# Patient Record
Sex: MALE | Race: BLACK OR AFRICAN AMERICAN | NOT HISPANIC OR LATINO | Employment: FULL TIME | URBAN - METROPOLITAN AREA
[De-identification: names, ages, dates, MRNs, and addresses within clinical notes are randomized per-mention and may not be internally consistent; named-entity substitution may affect disease eponyms.]

---

## 2023-09-27 PROBLEM — N18.6 END-STAGE RENAL DISEASE (MULTI): Status: ACTIVE | Noted: 2023-09-27

## 2023-09-27 PROBLEM — I10 ESSENTIAL HYPERTENSION: Status: ACTIVE | Noted: 2023-09-27

## 2023-09-27 RX ORDER — FUROSEMIDE 80 MG/1
TABLET ORAL
COMMUNITY

## 2023-09-27 RX ORDER — LABETALOL 200 MG/1
1 TABLET, FILM COATED ORAL 3 TIMES DAILY
COMMUNITY

## 2023-09-27 RX ORDER — AMLODIPINE BESYLATE 10 MG/1
TABLET ORAL
COMMUNITY

## 2023-10-04 ENCOUNTER — TELEPHONE (OUTPATIENT)
Dept: TRANSPLANT | Facility: HOSPITAL | Age: 48
End: 2023-10-04
Payer: MEDICARE

## 2023-10-05 ENCOUNTER — APPOINTMENT (OUTPATIENT)
Dept: RADIOLOGY | Facility: HOSPITAL | Age: 48
End: 2023-10-05
Payer: COMMERCIAL

## 2023-10-05 ENCOUNTER — APPOINTMENT (OUTPATIENT)
Dept: TRANSPLANT | Facility: HOSPITAL | Age: 48
End: 2023-10-05
Payer: COMMERCIAL

## 2023-10-05 ENCOUNTER — APPOINTMENT (OUTPATIENT)
Dept: RADIOLOGY | Facility: HOSPITAL | Age: 48
End: 2023-10-05
Payer: MEDICARE

## 2023-10-05 ENCOUNTER — APPOINTMENT (OUTPATIENT)
Dept: CARDIOLOGY | Facility: HOSPITAL | Age: 48
End: 2023-10-05
Payer: MEDICARE

## 2023-10-05 ENCOUNTER — APPOINTMENT (OUTPATIENT)
Dept: TRANSPLANT | Facility: HOSPITAL | Age: 48
End: 2023-10-05
Payer: MEDICARE

## 2023-10-31 DIAGNOSIS — Z01.818 PREOP EXAMINATION: Primary | ICD-10-CM

## 2023-11-01 ENCOUNTER — APPOINTMENT (OUTPATIENT)
Dept: TRANSPLANT | Facility: HOSPITAL | Age: 48
End: 2023-11-01
Payer: MEDICARE

## 2023-11-02 ENCOUNTER — HOSPITAL ENCOUNTER (OUTPATIENT)
Dept: RADIOLOGY | Facility: HOSPITAL | Age: 48
Discharge: HOME | End: 2023-11-02
Payer: MEDICARE

## 2023-11-02 ENCOUNTER — OFFICE VISIT (OUTPATIENT)
Dept: TRANSPLANT | Facility: HOSPITAL | Age: 48
End: 2023-11-02
Payer: MEDICARE

## 2023-11-02 ENCOUNTER — APPOINTMENT (OUTPATIENT)
Dept: TRANSPLANT | Facility: HOSPITAL | Age: 48
End: 2023-11-02
Payer: MEDICARE

## 2023-11-02 ENCOUNTER — DOCUMENTATION (OUTPATIENT)
Dept: TRANSPLANT | Facility: HOSPITAL | Age: 48
End: 2023-11-02

## 2023-11-02 ENCOUNTER — LAB (OUTPATIENT)
Dept: LAB | Facility: LAB | Age: 48
End: 2023-11-02
Payer: MEDICARE

## 2023-11-02 ENCOUNTER — APPOINTMENT (OUTPATIENT)
Dept: RADIOLOGY | Facility: HOSPITAL | Age: 48
End: 2023-11-02
Payer: MEDICARE

## 2023-11-02 ENCOUNTER — HOSPITAL ENCOUNTER (OUTPATIENT)
Dept: CARDIOLOGY | Facility: HOSPITAL | Age: 48
Discharge: HOME | End: 2023-11-02
Payer: MEDICARE

## 2023-11-02 VITALS
OXYGEN SATURATION: 99 % | DIASTOLIC BLOOD PRESSURE: 100 MMHG | HEART RATE: 90 BPM | HEIGHT: 66 IN | BODY MASS INDEX: 36.82 KG/M2 | SYSTOLIC BLOOD PRESSURE: 134 MMHG | WEIGHT: 229.1 LBS | TEMPERATURE: 97.5 F

## 2023-11-02 DIAGNOSIS — Z01.818 ENCOUNTER FOR OTHER PREPROCEDURAL EXAMINATION: ICD-10-CM

## 2023-11-02 DIAGNOSIS — Z01.818 PRE-TRANSPLANT EVALUATION FOR KIDNEY TRANSPLANT: Primary | ICD-10-CM

## 2023-11-02 DIAGNOSIS — I10 ESSENTIAL HYPERTENSION: ICD-10-CM

## 2023-11-02 DIAGNOSIS — Z01.818 ENCOUNTER FOR OTHER PREPROCEDURAL EXAMINATION: Primary | ICD-10-CM

## 2023-11-02 DIAGNOSIS — N18.6 ANEMIA IN ESRD (END-STAGE RENAL DISEASE) (MULTI): ICD-10-CM

## 2023-11-02 DIAGNOSIS — D63.1 ANEMIA IN ESRD (END-STAGE RENAL DISEASE) (MULTI): ICD-10-CM

## 2023-11-02 DIAGNOSIS — N18.6 ESRD (END STAGE RENAL DISEASE) (MULTI): ICD-10-CM

## 2023-11-02 LAB
CMV IGG AVIDITY SERPL IA-RTO: REACTIVE %
EST. AVERAGE GLUCOSE BLD GHB EST-MCNC: 114 MG/DL
HBA1C MFR BLD: 5.6 %
HBV CORE AB SER QL: NONREACTIVE
HBV SURFACE AB SER-ACNC: 6.8 MIU/ML
HBV SURFACE AG SERPL QL IA: NONREACTIVE
HCV AB SER QL: NONREACTIVE
HIV 1+2 AB+HIV1 P24 AG SERPL QL IA: NONREACTIVE
PSA SERPL-MCNC: 0.48 NG/ML
T PALLIDUM AB SER QL: NONREACTIVE

## 2023-11-02 PROCEDURE — 86803 HEPATITIS C AB TEST: CPT

## 2023-11-02 PROCEDURE — 87389 HIV-1 AG W/HIV-1&-2 AB AG IA: CPT

## 2023-11-02 PROCEDURE — 36415 COLL VENOUS BLD VENIPUNCTURE: CPT

## 2023-11-02 PROCEDURE — 93017 CV STRESS TEST TRACING ONLY: CPT

## 2023-11-02 PROCEDURE — 3075F SYST BP GE 130 - 139MM HG: CPT | Performed by: INTERNAL MEDICINE

## 2023-11-02 PROCEDURE — 93018 CV STRESS TEST I&R ONLY: CPT | Performed by: RADIOLOGY

## 2023-11-02 PROCEDURE — 78452 HT MUSCLE IMAGE SPECT MULT: CPT | Performed by: RADIOLOGY

## 2023-11-02 PROCEDURE — 86706 HEP B SURFACE ANTIBODY: CPT

## 2023-11-02 PROCEDURE — 80307 DRUG TEST PRSMV CHEM ANLYZR: CPT

## 2023-11-02 PROCEDURE — 80323 ALKALOIDS NOS: CPT

## 2023-11-02 PROCEDURE — A9502 TC99M TETROFOSMIN: HCPCS | Performed by: TRANSPLANT SURGERY

## 2023-11-02 PROCEDURE — 78452 HT MUSCLE IMAGE SPECT MULT: CPT

## 2023-11-02 PROCEDURE — 80349 CANNABINOIDS NATURAL: CPT

## 2023-11-02 PROCEDURE — 3430000001 HC RX 343 DIAGNOSTIC RADIOPHARMACEUTICALS: Performed by: TRANSPLANT SURGERY

## 2023-11-02 PROCEDURE — 99214 OFFICE O/P EST MOD 30 MIN: CPT | Performed by: INTERNAL MEDICINE

## 2023-11-02 PROCEDURE — 3080F DIAST BP >= 90 MM HG: CPT | Performed by: INTERNAL MEDICINE

## 2023-11-02 PROCEDURE — 86481 TB AG RESPONSE T-CELL SUSP: CPT

## 2023-11-02 PROCEDURE — 87340 HEPATITIS B SURFACE AG IA: CPT

## 2023-11-02 PROCEDURE — 86704 HEP B CORE ANTIBODY TOTAL: CPT

## 2023-11-02 PROCEDURE — 84153 ASSAY OF PSA TOTAL: CPT

## 2023-11-02 PROCEDURE — 83036 HEMOGLOBIN GLYCOSYLATED A1C: CPT

## 2023-11-02 PROCEDURE — 93016 CV STRESS TEST SUPVJ ONLY: CPT | Performed by: RADIOLOGY

## 2023-11-02 PROCEDURE — 86780 TREPONEMA PALLIDUM: CPT

## 2023-11-02 PROCEDURE — 86644 CMV ANTIBODY: CPT

## 2023-11-02 PROCEDURE — 93016 CV STRESS TEST SUPVJ ONLY: CPT | Performed by: INTERNAL MEDICINE

## 2023-11-02 RX ADMIN — TETROFOSMIN 31.5 MILLICURIE: 0.23 INJECTION, POWDER, LYOPHILIZED, FOR SOLUTION INTRAVENOUS at 12:24

## 2023-11-02 RX ADMIN — TETROFOSMIN 9.9 MILLICURIE: 0.23 INJECTION, POWDER, LYOPHILIZED, FOR SOLUTION INTRAVENOUS at 10:10

## 2023-11-02 ASSESSMENT — PATIENT HEALTH QUESTIONNAIRE - PHQ9
9. THOUGHTS THAT YOU WOULD BE BETTER OFF DEAD, OR OF HURTING YOURSELF: NOT AT ALL
2. FEELING DOWN, DEPRESSED OR HOPELESS: NOT AT ALL
7. TROUBLE CONCENTRATING ON THINGS, SUCH AS READING THE NEWSPAPER OR WATCHING TELEVISION: NOT AT ALL
6. FEELING BAD ABOUT YOURSELF - OR THAT YOU ARE A FAILURE OR HAVE LET YOURSELF OR YOUR FAMILY DOWN: NOT AT ALL
1. LITTLE INTEREST OR PLEASURE IN DOING THINGS: NOT AT ALL
8. MOVING OR SPEAKING SO SLOWLY THAT OTHER PEOPLE COULD HAVE NOTICED. OR THE OPPOSITE, BEING SO FIGETY OR RESTLESS THAT YOU HAVE BEEN MOVING AROUND A LOT MORE THAN USUAL: NOT AT ALL
4. FEELING TIRED OR HAVING LITTLE ENERGY: SEVERAL DAYS
SUM OF ALL RESPONSES TO PHQ QUESTIONS 1-9: 1
10. IF YOU CHECKED OFF ANY PROBLEMS, HOW DIFFICULT HAVE THESE PROBLEMS MADE IT FOR YOU TO DO YOUR WORK, TAKE CARE OF THINGS AT HOME, OR GET ALONG WITH OTHER PEOPLE: NOT DIFFICULT AT ALL
5. POOR APPETITE OR OVEREATING: NOT AT ALL
SUM OF ALL RESPONSES TO PHQ9 QUESTIONS 1 & 2: 0
3. TROUBLE FALLING OR STAYING ASLEEP OR SLEEPING TOO MUCH: NOT AT ALL

## 2023-11-02 ASSESSMENT — ANXIETY QUESTIONNAIRES
GAD7 TOTAL SCORE: 0
4. TROUBLE RELAXING: NOT AT ALL
3. WORRYING TOO MUCH ABOUT DIFFERENT THINGS: NOT AT ALL
6. BECOMING EASILY ANNOYED OR IRRITABLE: NOT AT ALL
IF YOU CHECKED OFF ANY PROBLEMS ON THIS QUESTIONNAIRE, HOW DIFFICULT HAVE THESE PROBLEMS MADE IT FOR YOU TO DO YOUR WORK, TAKE CARE OF THINGS AT HOME, OR GET ALONG WITH OTHER PEOPLE: NOT DIFFICULT AT ALL
5. BEING SO RESTLESS THAT IT IS HARD TO SIT STILL: NOT AT ALL
2. NOT BEING ABLE TO STOP OR CONTROL WORRYING: NOT AT ALL
7. FEELING AFRAID AS IF SOMETHING AWFUL MIGHT HAPPEN: NOT AT ALL
1. FEELING NERVOUS, ANXIOUS, OR ON EDGE: NOT AT ALL

## 2023-11-02 ASSESSMENT — PAIN SCALES - GENERAL: PAINLEVEL: 0-NO PAIN

## 2023-11-02 NOTE — PROGRESS NOTES
TRANSPLANT NEPHROLOGY CONSULT :   KIDNEY TRANSPLANT RECIPIENT EVALUATION        SERVICE DATE: 11/02/2023     REASON FOR CONSULT/CHIEF COMPLAINT:    FOR KIDNEY TRANSPLANT RECIPIENT EVALUATION.    HPI:    Mr. Rolle is a 48 y.o. male with past medical history significant for hypertension and end stage renal disease secondary to hypertension. He has been on hemodialysis since 7/16/19 and  has been on our transplant list since 2/11/2020.    ESRD - currently dialyzes on MWF , at  renal [facility]. RUE AVF. He is tolerating dialysis well with no issues. .5 Kg. BMI 36.9.    Pre Transplant Info    Ethnicity: Black or .   Primary Cause of Organ Disease: Hypertensive Nephrosclerosis.   Biopsy: Biopsy done. See result in Lab section.   Dialysis History: Hemodialysis, dialysis start date: 7/16/19, MWF.   Dialysis Access: Access is clean, dry and intact. RUE AVF.   Listing Information: listing date: 2/11/2020.   Transfusion History: Blood Type: O positive, MIGNON denied history of blood transfusions.   Support: Girlfriend, who is with him today     BLOOD TYPE:  O    Functional status :   Good    Urine output :   The patient makes urine output approximately > 1 L per day (per patient report)    Potential Donor :  Not at this time    Last GFR /Creatinine:   Lab Results   Component Value Date    CREATININE 7.23 (H) 08/31/2021     Creatinine clearance cannot be calculated (Patient's most recent lab result is older than the maximum 7 days allowed.)    Hx of PRBC Transfusion  Denied    Recent Hospitalization/ED visit:  None over the past 6 months    The patient is here for kidney transplant recipient evaluation. Mr. Rolle has had multiple complications from end stage severe renal disease including anemia, secondary hyperparathyroidism, and osteodystrophy. The patient is here today for an evaluation for kidney transplantation to improve quality of life and decrease the risk of cardiovascular disease,  coronary artery disease and stroke.     The patient is doing well without complaints. Denied chest pain, shortness of breath, palpitation, dyspnea on exertion, dysuria, fever, nausea, vomiting, diarrhea and flu-liked symptoms. No swelling of the extremities. No recent hospitalization or ED visit.      ROS:  Review of  14 systems was performed system by system. See HPI. Otherwise, the symptoms were negative.    PAST MEDICAL HISTORY:  Past Medical History:   Diagnosis Date    Personal history of diseases of the blood and blood-forming organs and certain disorders involving the immune mechanism     History of anemia    Personal history of other diseases of the circulatory system     History of hypertension    Personal history of other diseases of urinary system     History of chronic kidney disease        PAST SURGICAL HISTORY:  Past Surgical History:   Procedure Laterality Date    CT ANGIO HEART CORONARY  1/9/2020    CT HEART CORONARY ANGIOGRAM 1/9/2020 [facility]    OTHER SURGICAL HISTORY  11/19/2019    Kidney biopsy    OTHER SURGICAL HISTORY  11/19/2019    Arteriovenous fistula creation procedure        SOCIAL HISTORY:  Social History     Socioeconomic History    Marital status: Single     Spouse name: Not on file    Number of children: Not on file    Years of education: Not on file    Highest education level: Not on file   Occupational History    Not on file   Tobacco Use    Smoking status: Not on file    Smokeless tobacco: Not on file   Substance and Sexual Activity    Alcohol use: Not on file    Drug use: Not on file    Sexual activity: Not on file   Other Topics Concern    Not on file   Social History Narrative    Not on file     Social Determinants of Health     Financial Resource Strain: Not on file   Food Insecurity: Not on file   Transportation Needs: Not on file   Physical Activity: Not on file   Stress: Not on file   Social Connections: Not on file   Intimate Partner Violence: Not on file  "  Housing Stability: Not on file       FAMILY HISTORY:  No family history on file.    MEDICATION LIST:  Current Outpatient Medications   Medication Instructions    amLODIPine (Norvasc) 10 mg tablet amLODIPine Besylate 10 MG Oral Tablet   Refills: 0       Active    furosemide (Lasix) 80 mg tablet Furosemide 80 MG Oral Tablet   Refills: 0       Active    labetalol (Normodyne) 200 mg tablet 1 tablet, oral, 3 times daily       ALLERGY  No Known Allergies    PHYSICAL EXAM:    Visit Vitals  BP (!) 134/100   Pulse 90   Temp 36.4 °C (97.5 °F) (Temporal)   Ht 1.676 m (5' 6\")   Wt 104 kg (229 lb 1.6 oz)   SpO2 99%   BMI 36.98 kg/m²   BSA 2.2 m²        General Appearance - NAD, Good speech, oriented and alert  HEENT - Supple. Not pale. No jaundice. No cervical lymphadenopathy. Pharynx and tonsils are not injected.  CVS - RRR. Normal S1/S2. No murmur, click , rub or gallop  Lungs- clear to auscultation bilaterally  Abdomen - soft , not tender, no guarding, no rigidity. No hepatosplenomegaly. Normal bowel sounds. No masses and ascites. S/P Kidney transplant .  Transplanted kidney is not tender.   Musculoskeletal /Extremities - no edema. Full ROM. No joint tenderness.   Neuro/Psych - appropriate mood and affect. Motor power V/V all extremities. CN I -XII were grossly intact.  Skin - No visible rash  Dialysis access : RUE AVF is clean, dry and intact. No signs of infection.      LABS:    Lab Results   Component Value Date    WBC 9.0 08/31/2021    HGB 12.9 (L) 08/31/2021    HCT 38.4 (L) 08/31/2021     (H) 08/31/2021    ALT 20 08/31/2021    AST 13 08/31/2021     08/31/2021    K 4.1 08/31/2021    CL 93 (L) 08/31/2021    CREATININE 7.23 (H) 08/31/2021    BUN 44 (H) 08/31/2021    CO2 32 08/31/2021    PSA 0.54 08/25/2022    INR 1.0 11/19/2019    HGBA1C 5.8 (A) 08/25/2022     Kidney Biopsy 7/18/2019  -SEVERE ARTERIAL AND ARTERIOLAR SCLEROSIS, WITH SEVERE AND EXTENSIVE      VASCULAR WALL REMODELLING RESULTING IN ONIONSKIN " LESIONS, AND SIGNS OF PARENCHYMAL HYPOPERFUSION AND ATROPHY, MOST LIKELY REPRESENTING THE EXPRESSION OF A PRIMARY FORM OF VASCULAR AND ENDOTHELIAL INJURY OR A CHRONIC THROMBOTIC ANGIOPATHY     - THE POSSIBILITY OF A HOMOZYGOUS OR COMPOUND HETEROZYGOUS CARRIER STATE OF THE RISK ALLELES OF APOL1 SHOULD ALSO BE CONSIDERED AS A POTENTIALLY  IMPORTANT CONTRIBUTING GENETIC FACTOR TO THE VASCULAR DISEASE AND THE GLOMERULOSCLEROSIS SEEN IN THIS  PATIENT  - ACUTE TUBULAR INJURY, MODERATELY EXTENSIVE  - THERE IS NO EVIDENCE OF IMMUNE COMPLEX-MEDIATED DISEASE, PARAPROTEIN,DEPOSITION DISEASE, PRIMARY PODOCYTE DISEASE, OR ACUTE INTERSTITIAL NEPHRITIS    MODERATE CHRONIC CHANGES OF THE PARENCHYMA, INCLUDING:     - FOCAL GLOBAL AND SEGMENTAL GLOMERULOSCLEROSIS (26% OF GLOMERULI)     - FOCAL GLOMERULAR HYPOPERFUSION (35% OF GLOMERULI)     - FOCAL TUBULAR ATROPHY AND INTERSTITIAL FIBROSIS (40% OF THE CORTEX)     - MODERATE MEDULLARY FIBROSIS     - SEVERE ARTERIAL AND ARTERIOLAR SCLEROSIS        ASSESSMENT AND PLAN:    After completion of taking history and physical examination, the patient seems to be a  suitable candidate to proceed with the rest of transplant evaluation.    However, patient will need the following tests to determine the eligibility for kidney transplant per [facility]'s kidney transplant evaluation guideline :    - Pending Echo, stress test, colonoscopy and lab  - Recommend Renasight  -Update cancer screening per age/sex   - Will need to complete the rest of work up per protocol    =========================================================================    The case will be presented at the selection committee at the [facility].  The final decision from the committee will be sent out to notify the patient/primary care physician/ nephrologist. The above recommendations were discussed with the patient at length.     In addition, the following were also  discussed:    - Risks and benefits of transplantation, both short-term and long-term    - Risk of primary graft non-function, DGF, SGF, rejection, primary disease recurrence, return to dialysis    - Risks of immunosuppression including infections, CA, CV risk    - Need for compliance with medications and medical care in general      The patient expressed understanding of the above and wishes to proceed.  I answered all of his questions. I urged the patient to look for living donors.    - I have spent over 60 minutes with the patient, reviewing medical record, lab result , CXR result and other specialty's notes. More than 50% of the time was spent in counseling, explaining about the transplantation and answering the questions. I also reviewed the medical record, blood test results, imaging and previous studies which were obtained from the nephrologists. I also order the tests needed to complete the evaluation and I will review the results of those tests.    Thank you for this consultation. Please feel free to contact me for questions.      Meghan Diaz    Transplant Nephrology

## 2023-11-02 NOTE — PROGRESS NOTES
"RANDELL met with pt and pt's significant other, Candi for annual psychosocial update. Pt and his significant other were pleasant and engaged.  Pt reported his insurance has changed, and he now has [employer] Medicare.  Pt denied having any financial concerns or difficulty obtaining prescription medications.  Pt denied having any emergency department visits or hospitalizations.  Pt attends dialysis through [facility] on M, W, F for 4 hours.  Pt reported good compliance with dialysis, medications, and medical appointments.  Pt's supports remain his significant other, Candi ([phone]) and his Mother, Caitlin ([phone]).  Supports drive, have reliable transportation, and are available on short notice.  Pt denied any mental health concerns and described his mood as \"pretty good.\"  PHQ-9 was administered and pt scored a \"1\" which indicates none to minimal depression.  BALJEET-7 was administered and pt scored a \"0\" which indicates none to minimal anxiety.  Pt denied using any nicotine, alcohol, or illicit substances.  Pt reported having a medical Marijuana card for muscle spasms and other symptoms after dialysis.  Copy provided and in chart.  SW discussed transplant process. Reviewed inpatient stay, 4-8 days, post op appointments 1x/wk nephrologist / surgeon appointment, 2x/wk labs, all appointments will be at [facility].  SW also reviewed there may be a need for dialysis post-transplant and treatments will be at [facility].  Pt expressed understanding.  Pt remains low psychosocial risk.  RANDELL will follow up with pt annually.  "

## 2023-11-04 LAB
NIL(NEG) CONTROL SPOT COUNT: NORMAL
PANEL A SPOT COUNT: 2
PANEL B SPOT COUNT: 0
POS CONTROL SPOT COUNT: NORMAL
T-SPOT. TB INTERPRETATION: NEGATIVE

## 2023-11-05 LAB
AMPHETAMINES SERPL QL SCN: NEGATIVE NG/ML
ANNOTATION COMMENT IMP: NORMAL
BARBITURATES SERPL QL SCN: NEGATIVE NG/ML
BENZODIAZ SERPL QL SCN: NEGATIVE NG/ML
BUPRENORPHINE SERPL-MCNC: NEGATIVE NG/ML
CANNABINOIDS SERPL QL SCN: POSITIVE NG/ML
COCAINE SERPL QL SCN: NEGATIVE NG/ML
METHADONE SERPL QL SCN: NEGATIVE NG/ML
METHAMPHET SERPL QL: NEGATIVE NG/ML
OPIATES SERPL QL SCN: NEGATIVE NG/ML
OXYCODONE SERPL QL: NEGATIVE NG/ML
PCP SERPL QL SCN: NEGATIVE NG/ML

## 2023-11-06 ENCOUNTER — TELEPHONE (OUTPATIENT)
Dept: TRANSPLANT | Facility: HOSPITAL | Age: 48
End: 2023-11-06
Payer: MEDICARE

## 2023-11-06 LAB
COTININE SERPL-MCNC: <5 NG/ML
NICOTINE SERPL-MCNC: <5 NG/ML

## 2023-11-06 NOTE — TELEPHONE ENCOUNTER
HLA sample out of date. Called patient. Patient is out of kits. Will request more kits be sent to patient.

## 2023-11-10 LAB — CANNABINOIDS SERPL-MCNC: 20 NG/ML

## 2023-11-16 ENCOUNTER — TELEPHONE (OUTPATIENT)
Dept: TRANSPLANT | Facility: HOSPITAL | Age: 48
End: 2023-11-16

## 2023-12-29 ENCOUNTER — TELEPHONE (OUTPATIENT)
Dept: TRANSPLANT | Facility: HOSPITAL | Age: 48
End: 2023-12-29
Payer: MEDICARE

## 2023-12-29 NOTE — TELEPHONE ENCOUNTER
Called patient and left detailed VM requesting he call back to provide information regarding where he may have had labs completed prior to starting dialysis for potential GFR waiting time modification.  Provided my contact info for him to return call.

## 2024-01-19 ENCOUNTER — LAB REQUISITION (OUTPATIENT)
Dept: LAB | Facility: CLINIC | Age: 49
End: 2024-01-19
Payer: MEDICARE

## 2024-01-19 DIAGNOSIS — N18.6 END STAGE RENAL DISEASE (MULTI): ICD-10-CM

## 2024-01-19 LAB
HLA CLS I TYP PNL BLD/T DONR HIGH RES: NORMAL
HLA RESULTS: NORMAL
HLA-DP2 QL: NORMAL
HLA-DQB1 HIGH RES: NORMAL
HLA-DRB1 HIGH RES: NORMAL

## 2024-02-15 ENCOUNTER — DOCUMENTATION (OUTPATIENT)
Dept: TRANSPLANT | Facility: HOSPITAL | Age: 49
End: 2024-02-15
Payer: MEDICARE

## 2024-02-19 ENCOUNTER — DOCUMENTATION (OUTPATIENT)
Dept: TRANSPLANT | Facility: HOSPITAL | Age: 49
End: 2024-02-19
Payer: MEDICARE

## 2024-04-10 ENCOUNTER — TELEPHONE (OUTPATIENT)
Dept: TRANSPLANT | Facility: HOSPITAL | Age: 49
End: 2024-04-10
Payer: MEDICARE

## 2024-04-10 NOTE — TELEPHONE ENCOUNTER
Called and spoke with patient to remind them to update monthly HLA sample.  Patient requesting more kits be mailed to their home address.  Confirmed address in chart, and coordinator notified.

## 2024-05-01 ENCOUNTER — TELEPHONE (OUTPATIENT)
Dept: TRANSPLANT | Facility: HOSPITAL | Age: 49
End: 2024-05-01

## 2024-05-01 NOTE — TELEPHONE ENCOUNTER
Called and spoke with patient to remind them to update monthly HLA sample.  Patient requesting more kits be mailed to their home address.  Confirmed address in chart, and coordinator notified.   
no dysuria, no frequency, and no hematuria.

## 2024-05-16 ENCOUNTER — DOCUMENTATION (OUTPATIENT)
Dept: TRANSPLANT | Facility: HOSPITAL | Age: 49
End: 2024-05-16
Payer: MEDICARE

## 2024-05-16 NOTE — PROGRESS NOTES
Called and spoke with patient to remind them to update monthly HLA sample.  Patient requesting more kits be mailed to their home address.  Confirmed address in chart, and coordinator notified. Patient says that he have already requested kits several weeks ago, and still havent received as of 05/16/2024

## 2024-06-07 ENCOUNTER — IMMUNIZATION (OUTPATIENT)
Dept: TRANSPLANT | Facility: HOSPITAL | Age: 49
End: 2024-06-07
Payer: MEDICARE

## 2024-06-07 PROCEDURE — 86832 HLA CLASS I HIGH DEFIN QUAL: CPT | Mod: OUT | Performed by: SURGERY

## 2024-06-07 NOTE — PROGRESS NOTES
Lab Results   Component Value Date    HEPBSAB 6.8 11/02/2023       Immunization History   Administered Date(s) Administered Comments    Hepatitis B vaccine, adult (RECOMBIVAX, ENGERIX) 01/18/2022 07/04/2022 08/12/2022 02/07/2023

## 2024-06-14 ENCOUNTER — LAB REQUISITION (OUTPATIENT)
Dept: LAB | Facility: CLINIC | Age: 49
End: 2024-06-14
Payer: MEDICARE

## 2024-06-14 DIAGNOSIS — N18.6 END STAGE RENAL DISEASE (MULTI): ICD-10-CM

## 2024-06-14 LAB
HLA CLS I TYP PNL BLD/T DONR HIGH RES: NORMAL
HLA RESULTS: NORMAL
HLA RESULTS: NORMAL
HLA-A+B+C AB NFR SER: NORMAL %
HLA-DP+DQ+DR AB NFR SER: NORMAL %
HLA-DP2 QL: NORMAL
HLA-DQB1 HIGH RES: NORMAL
HLA-DRB1 HIGH RES: NORMAL

## 2024-06-21 ENCOUNTER — LAB REQUISITION (OUTPATIENT)
Dept: LAB | Facility: CLINIC | Age: 49
End: 2024-06-21
Payer: MEDICARE

## 2024-06-21 DIAGNOSIS — N18.6 END STAGE RENAL DISEASE (MULTI): ICD-10-CM

## 2024-07-18 ENCOUNTER — TELEPHONE (OUTPATIENT)
Dept: TRANSPLANT | Facility: HOSPITAL | Age: 49
End: 2024-07-18
Payer: MEDICARE

## 2024-07-18 NOTE — TELEPHONE ENCOUNTER
Attempted to reach patient about needing to update HLA sample.  Left VM requesting updated HLA sample.  Provided Pre Kidney office [phone] for questions.  Coordinator notified.

## 2024-07-23 PROCEDURE — 86808 CYTOTOXIC ANTIBODY SCREENING: CPT | Mod: OUT | Performed by: SURGERY

## 2024-07-31 ENCOUNTER — LAB REQUISITION (OUTPATIENT)
Dept: LAB | Facility: CLINIC | Age: 49
End: 2024-07-31
Payer: MEDICARE

## 2024-07-31 DIAGNOSIS — N18.6 END STAGE RENAL DISEASE (MULTI): ICD-10-CM

## 2024-07-31 LAB — FREEZE CROSSMATCH: NORMAL

## 2024-08-23 PROCEDURE — 86808 CYTOTOXIC ANTIBODY SCREENING: CPT | Mod: OUT | Performed by: SURGERY

## 2024-08-27 ENCOUNTER — TELEPHONE (OUTPATIENT)
Dept: TRANSPLANT | Facility: HOSPITAL | Age: 49
End: 2024-08-27
Payer: MEDICARE

## 2024-08-27 NOTE — TELEPHONE ENCOUNTER
Called and spoke to patient about outdated HLA Sample.  Patient stated they completed sample and sample has already been mailed.  Coordinator notified. Patient stated he completed sample last week.

## 2024-08-29 ENCOUNTER — LAB REQUISITION (OUTPATIENT)
Dept: LAB | Facility: CLINIC | Age: 49
End: 2024-08-29
Payer: MEDICARE

## 2024-08-29 DIAGNOSIS — N18.6 END STAGE RENAL DISEASE (MULTI): ICD-10-CM

## 2024-08-29 LAB — FREEZE CROSSMATCH: NORMAL

## 2024-09-18 PROCEDURE — 86832 HLA CLASS I HIGH DEFIN QUAL: CPT | Mod: OUT | Performed by: SURGERY

## 2024-09-23 PROCEDURE — 80505 PATH CLIN CONSLTJ HIGH 41-60: CPT | Performed by: PATHOLOGY

## 2024-09-24 ENCOUNTER — LAB REQUISITION (OUTPATIENT)
Dept: LAB | Facility: CLINIC | Age: 49
End: 2024-09-24
Payer: MEDICARE

## 2024-09-24 DIAGNOSIS — N18.6 END STAGE RENAL DISEASE (MULTI): ICD-10-CM

## 2024-09-25 LAB
HLA RESULTS: NORMAL
HLA-A+B+C AB NFR SER: NORMAL %
HLA-DP+DQ+DR AB NFR SER: NORMAL %

## 2024-09-30 ENCOUNTER — LAB REQUISITION (OUTPATIENT)
Dept: LAB | Facility: CLINIC | Age: 49
End: 2024-09-30
Payer: MEDICARE

## 2024-09-30 DIAGNOSIS — N18.6 END STAGE RENAL DISEASE (MULTI): ICD-10-CM

## 2024-10-15 PROCEDURE — 80505 PATH CLIN CONSLTJ HIGH 41-60: CPT | Performed by: PATHOLOGY

## 2024-10-21 PROCEDURE — 86808 CYTOTOXIC ANTIBODY SCREENING: CPT | Mod: OUT | Performed by: SURGERY

## 2024-10-30 ENCOUNTER — LAB REQUISITION (OUTPATIENT)
Dept: LAB | Facility: CLINIC | Age: 49
End: 2024-10-30
Payer: MEDICARE

## 2024-10-30 DIAGNOSIS — N18.6 END STAGE RENAL DISEASE (MULTI): ICD-10-CM

## 2024-10-30 LAB — FREEZE CROSSMATCH: NORMAL

## 2024-10-31 ENCOUNTER — LAB REQUISITION (OUTPATIENT)
Dept: LAB | Facility: CLINIC | Age: 49
End: 2024-10-31
Payer: MEDICARE

## 2024-10-31 DIAGNOSIS — N18.6 END STAGE RENAL DISEASE (MULTI): ICD-10-CM

## 2024-11-26 ENCOUNTER — DOCUMENTATION (OUTPATIENT)
Dept: TRANSPLANT | Facility: HOSPITAL | Age: 49
End: 2024-11-26
Payer: MEDICARE

## 2024-11-26 NOTE — PROGRESS NOTES
Lab Results   Component Value Date    HEPBSAB 6.8 11/02/2023       Immunization History   Administered Date(s) Administered Comments    Hepatitis B vaccine, adult *Check Product/Dose* 01/18/2022 07/04/2022 08/12/2022 02/07/2023      Patient was fully vaccinated. Patient is due for annual visit. [facility] task SA to schedule patient for it.

## 2024-12-03 ENCOUNTER — TELEPHONE (OUTPATIENT)
Dept: TRANSPLANT | Facility: HOSPITAL | Age: 49
End: 2024-12-03
Payer: MEDICARE

## 2024-12-04 ENCOUNTER — IMMUNIZATION (OUTPATIENT)
Dept: TRANSPLANT | Facility: HOSPITAL | Age: 49
End: 2024-12-04
Payer: MEDICARE

## 2024-12-04 ENCOUNTER — TELEPHONE (OUTPATIENT)
Dept: TRANSPLANT | Facility: HOSPITAL | Age: 49
End: 2024-12-04
Payer: MEDICARE

## 2024-12-04 PROCEDURE — 80505 PATH CLIN CONSLTJ HIGH 41-60: CPT | Performed by: PATHOLOGY

## 2024-12-04 NOTE — PROGRESS NOTES
Patient received 4 doses, on file in immunization history: 2/7/2023, 8/12/2022, 7/4/2022, 1/18/2022 .    Lab Results   Component Value Date    HEPBSAB 6.8 11/02/2023       Immunization History   Administered Date(s) Administered Comments    Hepatitis B vaccine, adult *Check Product/Dose* 01/18/2022 07/04/2022 08/12/2022 02/07/2023

## 2024-12-05 ENCOUNTER — TELEPHONE (OUTPATIENT)
Dept: TRANSPLANT | Facility: HOSPITAL | Age: 49
End: 2024-12-05
Payer: MEDICARE

## 2024-12-05 NOTE — TELEPHONE ENCOUNTER
Called patient to inform them we're moving from [address] to [address].   UPDATED PATIENT ADDRESS

## 2024-12-06 ENCOUNTER — DOCUMENTATION (OUTPATIENT)
Dept: TRANSPLANT | Facility: HOSPITAL | Age: 49
End: 2024-12-06
Payer: MEDICARE

## 2024-12-06 ENCOUNTER — TELEPHONE (OUTPATIENT)
Dept: TRANSPLANT | Facility: HOSPITAL | Age: 49
End: 2024-12-06
Payer: MEDICARE

## 2024-12-06 NOTE — PROGRESS NOTES
"Coordinators and surgeon received notice from Tierra that patient was now declining offer.    Called and spoke to Shahla at dialysis unit, who was going to talk to patient and give him the phone so coordinator/surgeon could speak to him again about his concerns.  After a few minutes, she returned to the phone and stated patient was \"becoming hostile\" about offer.  Surgeon informed her that offer would be declined for patient.  "

## 2024-12-06 NOTE — PROGRESS NOTES
Dr. Quinones requested I call patient about potential perfect match offer.  Patient had explained to Tierra that he had other obligations and would not be able to accept offer.  I called patient and left him a detaied message about offer and requested he call me back.  I was able to reach his S.O. Candi - she reported patient is at dialysis now but that she would tell him that we are trying to reach him and I would be calling him again in a minute.  After this, I attempted to reach patient about 5 minutes later but was unsuccessful.  I called directly to [facility] and spoke to Shahla.  She had patient call me back immediately, and I conferenced in Dr. Quinones on phone call so he could explain offer to patient.  Patient stated he needed to make a couple calls, but accepted offer.  Dr. Quinones then explained to Shahla that [facility] coordinator would call patient with instructions on offer and when to come in.  Plan was for patient to complete dialysis at about 1000 and come to hospital.

## 2024-12-06 NOTE — TELEPHONE ENCOUNTER
Call was placed to pt for an organ offer.  No answer. Left a HIPAA compliant voicemail and text message sent requesting a call back ASAP

## 2024-12-10 ENCOUNTER — DOCUMENTATION (OUTPATIENT)
Dept: TRANSPLANT | Facility: HOSPITAL | Age: 49
End: 2024-12-10
Payer: MEDICARE

## 2024-12-10 DIAGNOSIS — Z01.818 PRE-TRANSPLANT EVALUATION FOR KIDNEY TRANSPLANT: ICD-10-CM

## 2024-12-10 DIAGNOSIS — Z01.810 ENCOUNTER FOR PREPROCEDURAL CARDIOVASCULAR EXAMINATION: ICD-10-CM

## 2024-12-10 DIAGNOSIS — Z51.81 ENCOUNTER FOR THERAPEUTIC DRUG LEVEL MONITORING: ICD-10-CM

## 2024-12-10 DIAGNOSIS — N18.6 END STAGE RENAL DISEASE (MULTI): ICD-10-CM

## 2024-12-10 DIAGNOSIS — Z12.5 ENCOUNTER FOR SCREENING FOR MALIGNANT NEOPLASM OF PROSTATE: ICD-10-CM

## 2024-12-10 DIAGNOSIS — Z13.6 ENCOUNTER FOR SCREENING FOR CARDIOVASCULAR DISORDERS: ICD-10-CM

## 2024-12-10 DIAGNOSIS — Z79.899 OTHER LONG TERM (CURRENT) DRUG THERAPY: ICD-10-CM

## 2024-12-10 RX ORDER — REGADENOSON 0.08 MG/ML
0.4 INJECTION, SOLUTION INTRAVENOUS
OUTPATIENT
Start: 2024-12-10

## 2024-12-10 RX ORDER — AMINOPHYLLINE 25 MG/ML
125 INJECTION, SOLUTION INTRAVENOUS ONCE AS NEEDED
OUTPATIENT
Start: 2024-12-10

## 2024-12-13 ENCOUNTER — TELEPHONE (OUTPATIENT)
Dept: TRANSPLANT | Facility: HOSPITAL | Age: 49
End: 2024-12-13
Payer: MEDICARE

## 2024-12-13 NOTE — TELEPHONE ENCOUNTER
Pt called today.    He asked to be removed from our transplant list and stated he is pursuing kidney transplant at other locations but is not on their waiting lists yet.    I asked him if he wanted to remain on our list while he finished his evaluation and he declined.    He expressed frustration over multiple items while on the waiting list - being overdue for his annual appt (this was by about three weeks), not getting a call for an offer until a few weeks ago, and a remote issue with getting his labwork drawn.    I listened to his concerns and let him know we would remove him from the waiting list as per his choice.    Shannan Cm RN, was witness to this conversation.    Patient will be placed on consent agenda this week.

## 2024-12-19 ENCOUNTER — TELEPHONE (OUTPATIENT)
Dept: TRANSPLANT | Facility: HOSPITAL | Age: 49
End: 2024-12-19
Payer: MEDICARE

## 2024-12-19 NOTE — TELEPHONE ENCOUNTER
Attempted to reach patient about needing to update HLA sample.  Left VM requesting updated HLA sample.  Provided Pre Kidney office [phone] for questions.  Coordinator notified.

## 2024-12-20 ENCOUNTER — DOCUMENTATION (OUTPATIENT)
Dept: TRANSPLANT | Facility: HOSPITAL | Age: 49
End: 2024-12-20
Payer: MEDICARE

## 2024-12-20 ENCOUNTER — COMMITTEE REVIEW (OUTPATIENT)
Dept: TRANSPLANT | Facility: HOSPITAL | Age: 49
End: 2024-12-20
Payer: MEDICARE

## 2024-12-20 NOTE — COMMITTEE REVIEW
Evaluation Date: 11/19/2019   Committee Review Date: 12/19/2024   Organ being evaluated for: Kidney     Transplant Phase:  Waitlist   Transplant Status: Active     Referring Physician:     Transplant Physician:       Primary Diagnosis: Hypertensive Nephrosclerosis     Committee Members:   Jazmin Ferreira RDN, LD   Emilio Veronica Peggy, [place]   Pharmacy Clare Rai, PharmD   Psychology Greta Haider, PhD    Jacklyn Ríos [facility]   Transplant Wolf Mejias, LATESHA; Shannan Cm, LATESHA; Candi Cuevas, LATESHA; Nedra Montez, LATESHA; Monique Mcintosh, LATESHA; Nicki Delcid RN   Transplant Nephrology German Antony MD; Barbara Ulloa MD   Transplant Surgery Tong Bajwa MD; Abi Roman MD; Brielle Rojas MD; Sammy Quinones MD       Committee Review Decision:    The candidate's evaluation was presented and discussed at the Transplant Multidisciplinary Selection Conference. After review of the candidate's diagnosis and the evaluations of the multidisciplinary team members, the committee made the following recommendations:     Delist due to patient choice.      Lab Results   Component Value Date    HEPBSAB 6.8 11/02/2023       Immunization History   Administered Date(s) Administered Comments    Hepatitis B vaccine, adult *Check Product/Dose* 01/18/2022 07/04/2022 08/12/2022 02/07/2023

## 2024-12-23 ENCOUNTER — LAB REQUISITION (OUTPATIENT)
Dept: LAB | Facility: CLINIC | Age: 49
End: 2024-12-23
Payer: MEDICARE

## 2024-12-23 DIAGNOSIS — N18.6 END STAGE RENAL DISEASE (MULTI): ICD-10-CM

## 2024-12-23 LAB
DIAGNOSIS-VIRTUAL CROSSMATCH: NORMAL
PATH REVIEW-VIRTUAL CROSSMATCH: NORMAL
PATHOLOGIST ID-VIRTUAL CROSSMATCH: NORMAL

## 2025-03-26 ENCOUNTER — DOCUMENTATION (OUTPATIENT)
Dept: TRANSPLANT | Facility: HOSPITAL | Age: 50
End: 2025-03-26
Payer: MEDICARE

## 2025-03-26 NOTE — PROGRESS NOTES
Pt called and requested to be removed from our transplant list.  Recommendation:  Delist patient due to choice. 12/16/2024

## 2025-04-22 ENCOUNTER — APPOINTMENT (OUTPATIENT)
Dept: RADIOLOGY | Facility: CLINIC | Age: 50
End: 2025-04-22
Payer: MEDICARE